# Patient Record
Sex: MALE | Race: BLACK OR AFRICAN AMERICAN | NOT HISPANIC OR LATINO
[De-identification: names, ages, dates, MRNs, and addresses within clinical notes are randomized per-mention and may not be internally consistent; named-entity substitution may affect disease eponyms.]

---

## 2017-03-09 ENCOUNTER — RX RENEWAL (OUTPATIENT)
Age: 77
End: 2017-03-09

## 2017-06-05 ENCOUNTER — RX RENEWAL (OUTPATIENT)
Age: 77
End: 2017-06-05

## 2017-06-15 ENCOUNTER — APPOINTMENT (OUTPATIENT)
Dept: INTERNAL MEDICINE | Facility: CLINIC | Age: 77
End: 2017-06-15

## 2017-06-15 DIAGNOSIS — H61.23 IMPACTED CERUMEN, BILATERAL: ICD-10-CM

## 2017-07-11 ENCOUNTER — RX RENEWAL (OUTPATIENT)
Age: 77
End: 2017-07-11

## 2017-12-11 ENCOUNTER — RX RENEWAL (OUTPATIENT)
Age: 77
End: 2017-12-11

## 2017-12-15 ENCOUNTER — APPOINTMENT (OUTPATIENT)
Dept: INTERNAL MEDICINE | Facility: CLINIC | Age: 77
End: 2017-12-15
Payer: MEDICARE

## 2017-12-15 VITALS — HEIGHT: 71 IN

## 2017-12-15 PROCEDURE — 99214 OFFICE O/P EST MOD 30 MIN: CPT | Mod: 25

## 2017-12-15 PROCEDURE — 90662 IIV NO PRSV INCREASED AG IM: CPT

## 2017-12-15 PROCEDURE — G0008: CPT

## 2018-04-19 ENCOUNTER — RX RENEWAL (OUTPATIENT)
Age: 78
End: 2018-04-19

## 2018-04-23 ENCOUNTER — RX RENEWAL (OUTPATIENT)
Age: 78
End: 2018-04-23

## 2018-06-12 ENCOUNTER — APPOINTMENT (OUTPATIENT)
Dept: INTERNAL MEDICINE | Facility: CLINIC | Age: 78
End: 2018-06-12
Payer: MEDICARE

## 2018-06-12 DIAGNOSIS — J44.9 CHRONIC OBSTRUCTIVE PULMONARY DISEASE, UNSPECIFIED: ICD-10-CM

## 2018-06-12 PROCEDURE — 99214 OFFICE O/P EST MOD 30 MIN: CPT

## 2018-06-12 RX ORDER — IPRATROPIUM BROMIDE AND ALBUTEROL SULFATE 2.5; .5 MG/3ML; MG/3ML
0.5-2.5 (3) SOLUTION RESPIRATORY (INHALATION) 4 TIMES DAILY
Qty: 120 | Refills: 3 | Status: ACTIVE | COMMUNITY
Start: 2018-06-12 | End: 1900-01-01

## 2018-06-12 NOTE — HEALTH RISK ASSESSMENT
[] : No [No falls in past year] : Patient reported no falls in the past year [(PHQ-2) Unable to screen] : PHQ-2: unable to screen [UnableToScreenReason] : Dementia

## 2018-06-12 NOTE — HISTORY OF PRESENT ILLNESS
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 \par Generally doing ok.  \par Wife says he gets congested at home and is asking for nebulizer like in the hosp and suction.

## 2018-06-12 NOTE — ASSESSMENT
[FreeTextEntry1] : Severe PD and needs total care for ADLs  Generally stable\par Recurrent aspiration.  OK to have Duoneb in the home.  Doubt wife will be able to suction this pt.\par Peg replace 2x recently.

## 2018-06-12 NOTE — REVIEW OF SYSTEMS
[Vision Problems] : no vision problems [Chest Pain] : no chest pain [Shortness Of Breath] : no shortness of breath [Wheezing] : wheezing [Cough] : cough [Constipation] : no constipation [Diarrhea] : no diarrhea [Dysuria] : no dysuria [Skin Rash] : no skin rash [FreeTextEntry2] : see HPI [FreeTextEntry7] : Peg [FreeTextEntry9] : Contracted [de-identified] : see HPI [de-identified] : Dementia and rarely verbal

## 2018-06-12 NOTE — PHYSICAL EXAM
[No Acute Distress] : no acute distress [Well Nourished] : well nourished [Well Developed] : well developed [Well-Appearing] : well-appearing [Normal Sclera/Conjunctiva] : normal sclera/conjunctiva [PERRL] : pupils equal round and reactive to light [EOMI] : extraocular movements intact [Normal Outer Ear/Nose] : the outer ears and nose were normal in appearance [Normal Oropharynx] : the oropharynx was normal [Normal TMs] : both tympanic membranes were normal [No JVD] : no jugular venous distention [No Lymphadenopathy] : no lymphadenopathy [Thyroid Normal, No Nodules] : the thyroid was normal and there were no nodules present [No Respiratory Distress] : no respiratory distress  [Clear to Auscultation] : lungs were clear to auscultation bilaterally [No Accessory Muscle Use] : no accessory muscle use [Normal Rate] : normal rate  [Regular Rhythm] : with a regular rhythm [Normal S1, S2] : normal S1 and S2 [No Murmur] : no murmur heard [No Carotid Bruits] : no carotid bruits [No Abdominal Bruit] : a ~M bruit was not heard ~T in the abdomen [No Varicosities] : no varicosities [Pedal Pulses Present] : the pedal pulses are present [No Extremity Clubbing/Cyanosis] : no extremity clubbing/cyanosis [No Palpable Aorta] : no palpable aorta [Normal Appearance] : normal in appearance [Soft] : abdomen soft [Non Tender] : non-tender [Non-distended] : non-distended [No Masses] : no abdominal mass palpated [No HSM] : no HSM [Normal Bowel Sounds] : normal bowel sounds [Normal Supraclavicular Nodes] : no supraclavicular lymphadenopathy [Normal Posterior Cervical Nodes] : no posterior cervical lymphadenopathy [Normal Anterior Cervical Nodes] : no anterior cervical lymphadenopathy [No CVA Tenderness] : no CVA  tenderness [No Spinal Tenderness] : no spinal tenderness [No Joint Swelling] : no joint swelling [Grossly Normal Strength/Tone] : grossly normal strength/tone [No Rash] : no rash [de-identified] : Poor dentitiion  Lethargic [de-identified] : Clear [de-identified] : 2+ BLE edema [de-identified] : Peg site ok [de-identified] : Contracted [de-identified] : Responsive to calling his name

## 2018-08-31 ENCOUNTER — RX RENEWAL (OUTPATIENT)
Age: 78
End: 2018-08-31

## 2018-09-26 ENCOUNTER — RX RENEWAL (OUTPATIENT)
Age: 78
End: 2018-09-26

## 2018-09-28 ENCOUNTER — RX RENEWAL (OUTPATIENT)
Age: 78
End: 2018-09-28

## 2018-10-01 ENCOUNTER — RX RENEWAL (OUTPATIENT)
Age: 78
End: 2018-10-01

## 2018-10-09 ENCOUNTER — RX RENEWAL (OUTPATIENT)
Age: 78
End: 2018-10-09

## 2018-10-11 ENCOUNTER — RX RENEWAL (OUTPATIENT)
Age: 78
End: 2018-10-11

## 2018-12-14 ENCOUNTER — APPOINTMENT (OUTPATIENT)
Dept: INTERNAL MEDICINE | Facility: CLINIC | Age: 78
End: 2018-12-14
Payer: MEDICARE

## 2018-12-14 ENCOUNTER — APPOINTMENT (OUTPATIENT)
Dept: INTERNAL MEDICINE | Facility: CLINIC | Age: 78
End: 2018-12-14

## 2018-12-14 DIAGNOSIS — Z78.9 OTHER SPECIFIED HEALTH STATUS: ICD-10-CM

## 2018-12-14 DIAGNOSIS — Z93.1 GASTROSTOMY STATUS: ICD-10-CM

## 2018-12-14 DIAGNOSIS — R13.10 DYSPHAGIA, UNSPECIFIED: ICD-10-CM

## 2018-12-14 DIAGNOSIS — Z23 ENCOUNTER FOR IMMUNIZATION: ICD-10-CM

## 2018-12-14 DIAGNOSIS — I10 ESSENTIAL (PRIMARY) HYPERTENSION: ICD-10-CM

## 2018-12-14 DIAGNOSIS — G20 PARKINSON'S DISEASE: ICD-10-CM

## 2018-12-14 DIAGNOSIS — F03.91 UNSPECIFIED DEMENTIA WITH BEHAVIORAL DISTURBANCE: ICD-10-CM

## 2018-12-14 PROCEDURE — 90662 IIV NO PRSV INCREASED AG IM: CPT

## 2018-12-14 PROCEDURE — 99214 OFFICE O/P EST MOD 30 MIN: CPT | Mod: 25

## 2018-12-14 PROCEDURE — G0008: CPT

## 2018-12-14 PROCEDURE — 90670 PCV13 VACCINE IM: CPT

## 2018-12-14 PROCEDURE — G0009: CPT

## 2018-12-14 NOTE — REVIEW OF SYSTEMS
[Negative] : Respiratory [Vision Problems] : no vision problems [Chest Pain] : no chest pain [Shortness Of Breath] : no shortness of breath [Constipation] : no constipation [Diarrhea] : no diarrhea [Dysuria] : no dysuria [Skin Rash] : no skin rash [FreeTextEntry2] : see HPI [FreeTextEntry7] : Peg [FreeTextEntry9] : Contracted [de-identified] : see HPI [de-identified] : Dementia and rarely verbal

## 2018-12-14 NOTE — HISTORY OF PRESENT ILLNESS
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  \par  Neuro MD ordered anther med for PD.  He was too sleepy and she stopped it

## 2018-12-14 NOTE — ASSESSMENT
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  Needs wife and HHA for all ADL's\par Had Peg tube replaced at [facility] 11-28-18\par Recurrent aspiration has been stable.\par Voids well from Peg fluids and tolerates jevity x 6 cans per day and BMs are regular\par Health Care proxy provided by wife.  \par Labs wife prefers not doing since fragile arms and contracted.\par BP not done to to contractures.

## 2018-12-14 NOTE — HEALTH RISK ASSESSMENT
[No falls in past year] : Patient reported no falls in the past year [(PHQ-2) Unable to screen] : PHQ-2: unable to screen [] : No [UnableToScreenReason] : Dementia

## 2018-12-14 NOTE — PHYSICAL EXAM
[No Acute Distress] : no acute distress [Well Nourished] : well nourished [Well Developed] : well developed [Well-Appearing] : well-appearing [Normal Sclera/Conjunctiva] : normal sclera/conjunctiva [PERRL] : pupils equal round and reactive to light [EOMI] : extraocular movements intact [Normal Outer Ear/Nose] : the outer ears and nose were normal in appearance [Normal Oropharynx] : the oropharynx was normal [Normal TMs] : both tympanic membranes were normal [No JVD] : no jugular venous distention [No Lymphadenopathy] : no lymphadenopathy [Thyroid Normal, No Nodules] : the thyroid was normal and there were no nodules present [No Respiratory Distress] : no respiratory distress  [Clear to Auscultation] : lungs were clear to auscultation bilaterally [No Accessory Muscle Use] : no accessory muscle use [Normal Rate] : normal rate  [Regular Rhythm] : with a regular rhythm [Normal S1, S2] : normal S1 and S2 [No Murmur] : no murmur heard [No Carotid Bruits] : no carotid bruits [No Abdominal Bruit] : a ~M bruit was not heard ~T in the abdomen [No Varicosities] : no varicosities [Pedal Pulses Present] : the pedal pulses are present [No Extremity Clubbing/Cyanosis] : no extremity clubbing/cyanosis [No Palpable Aorta] : no palpable aorta [Normal Appearance] : normal in appearance [Soft] : abdomen soft [Non Tender] : non-tender [Non-distended] : non-distended [No Masses] : no abdominal mass palpated [No HSM] : no HSM [Normal Bowel Sounds] : normal bowel sounds [Normal Supraclavicular Nodes] : no supraclavicular lymphadenopathy [Normal Posterior Cervical Nodes] : no posterior cervical lymphadenopathy [Normal Anterior Cervical Nodes] : no anterior cervical lymphadenopathy [No CVA Tenderness] : no CVA  tenderness [No Spinal Tenderness] : no spinal tenderness [No Joint Swelling] : no joint swelling [Grossly Normal Strength/Tone] : grossly normal strength/tone [No Rash] : no rash [de-identified] : Poor dentitiion  Lethargic [de-identified] : Clear [de-identified] : Peg site ok [de-identified] : Contracted [de-identified] : Responsive to calling his name

## 2019-02-01 ENCOUNTER — RX RENEWAL (OUTPATIENT)
Age: 79
End: 2019-02-01

## 2019-02-01 ENCOUNTER — APPOINTMENT (OUTPATIENT)
Dept: INTERNAL MEDICINE | Facility: CLINIC | Age: 79
End: 2019-02-01

## 2019-02-13 ENCOUNTER — APPOINTMENT (OUTPATIENT)
Dept: INTERNAL MEDICINE | Facility: CLINIC | Age: 79
End: 2019-02-13

## 2019-03-10 ENCOUNTER — RX RENEWAL (OUTPATIENT)
Age: 79
End: 2019-03-10

## 2019-09-16 ENCOUNTER — RX RENEWAL (OUTPATIENT)
Age: 79
End: 2019-09-16

## 2020-05-26 ENCOUNTER — RX RENEWAL (OUTPATIENT)
Age: 80
End: 2020-05-26

## 2020-05-26 RX ORDER — ERGOCALCIFEROL 1.25 MG/1
1.25 MG CAPSULE, LIQUID FILLED ORAL
Qty: 12 | Refills: 3 | Status: ACTIVE | COMMUNITY
Start: 2017-06-05 | End: 1900-01-01